# Patient Record
Sex: MALE | Race: WHITE | NOT HISPANIC OR LATINO
[De-identification: names, ages, dates, MRNs, and addresses within clinical notes are randomized per-mention and may not be internally consistent; named-entity substitution may affect disease eponyms.]

---

## 2020-09-23 ENCOUNTER — APPOINTMENT (OUTPATIENT)
Dept: HEART AND VASCULAR | Facility: CLINIC | Age: 85
End: 2020-09-23
Payer: MEDICARE

## 2020-09-23 VITALS
WEIGHT: 155 LBS | TEMPERATURE: 97.6 F | HEIGHT: 70 IN | OXYGEN SATURATION: 97 % | RESPIRATION RATE: 15 BRPM | BODY MASS INDEX: 22.19 KG/M2 | SYSTOLIC BLOOD PRESSURE: 118 MMHG | HEART RATE: 98 BPM | DIASTOLIC BLOOD PRESSURE: 72 MMHG

## 2020-09-23 DIAGNOSIS — Z82.49 FAMILY HISTORY OF ISCHEMIC HEART DISEASE AND OTHER DISEASES OF THE CIRCULATORY SYSTEM: ICD-10-CM

## 2020-09-23 DIAGNOSIS — E78.2 MIXED HYPERLIPIDEMIA: ICD-10-CM

## 2020-09-23 DIAGNOSIS — R53.83 OTHER FATIGUE: ICD-10-CM

## 2020-09-23 DIAGNOSIS — I10 ESSENTIAL (PRIMARY) HYPERTENSION: ICD-10-CM

## 2020-09-23 DIAGNOSIS — Z78.9 OTHER SPECIFIED HEALTH STATUS: ICD-10-CM

## 2020-09-23 DIAGNOSIS — Z82.3 FAMILY HISTORY OF STROKE: ICD-10-CM

## 2020-09-23 PROBLEM — Z00.00 ENCOUNTER FOR PREVENTIVE HEALTH EXAMINATION: Status: ACTIVE | Noted: 2020-09-23

## 2020-09-23 PROCEDURE — 93224 XTRNL ECG REC UP TO 48 HRS: CPT

## 2020-09-23 PROCEDURE — 99204 OFFICE O/P NEW MOD 45 MIN: CPT

## 2020-09-23 RX ORDER — AMLODIPINE BESYLATE 5 MG/1
5 TABLET ORAL
Refills: 0 | Status: ACTIVE | COMMUNITY

## 2020-09-23 RX ORDER — METOPROLOL SUCCINATE 50 MG/1
50 TABLET, EXTENDED RELEASE ORAL
Refills: 0 | Status: ACTIVE | COMMUNITY

## 2020-09-23 RX ORDER — SIMVASTATIN 20 MG/1
20 TABLET, FILM COATED ORAL
Refills: 0 | Status: ACTIVE | COMMUNITY

## 2020-09-23 NOTE — PHYSICAL EXAM
[General Appearance - Well Developed] : well developed [Normal Appearance] : normal appearance [Well Groomed] : well groomed [General Appearance - Well Nourished] : well nourished [No Deformities] : no deformities [General Appearance - In No Acute Distress] : no acute distress [Normal Conjunctiva] : the conjunctiva exhibited no abnormalities [Eyelids - No Xanthelasma] : the eyelids demonstrated no xanthelasmas [Normal Oral Mucosa] : normal oral mucosa [No Oral Pallor] : no oral pallor [No Oral Cyanosis] : no oral cyanosis [Normal Jugular Venous A Waves Present] : normal jugular venous A waves present [Normal Jugular Venous V Waves Present] : normal jugular venous V waves present [No Jugular Venous Yoder A Waves] : no jugular venous yoder A waves [Respiration, Rhythm And Depth] : normal respiratory rhythm and effort [Exaggerated Use Of Accessory Muscles For Inspiration] : no accessory muscle use [Auscultation Breath Sounds / Voice Sounds] : lungs were clear to auscultation bilaterally [Heart Rate And Rhythm] : heart rate and rhythm were normal [Heart Sounds] : normal S1 and S2 [Murmurs] : no murmurs present [Abdomen Soft] : soft [Abdomen Tenderness] : non-tender [Abdomen Mass (___ Cm)] : no abdominal mass palpated [Nail Clubbing] : no clubbing of the fingernails [Cyanosis, Localized] : no localized cyanosis [Petechial Hemorrhages (___cm)] : no petechial hemorrhages [Skin Color & Pigmentation] : normal skin color and pigmentation [] : no rash [No Venous Stasis] : no venous stasis [Skin Lesions] : no skin lesions [No Skin Ulcers] : no skin ulcer [No Xanthoma] : no  xanthoma was observed [Oriented To Time, Place, And Person] : oriented to person, place, and time [Affect] : the affect was normal [Mood] : the mood was normal [No Anxiety] : not feeling anxious

## 2020-09-23 NOTE — HISTORY OF PRESENT ILLNESS
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

## 2020-09-24 ENCOUNTER — APPOINTMENT (OUTPATIENT)
Dept: HEART AND VASCULAR | Facility: CLINIC | Age: 85
End: 2020-09-24
Payer: MEDICARE

## 2020-09-24 VITALS
OXYGEN SATURATION: 96 % | SYSTOLIC BLOOD PRESSURE: 110 MMHG | TEMPERATURE: 97.6 F | WEIGHT: 155 LBS | BODY MASS INDEX: 22.19 KG/M2 | RESPIRATION RATE: 15 BRPM | HEART RATE: 93 BPM | DIASTOLIC BLOOD PRESSURE: 66 MMHG | HEIGHT: 70 IN

## 2020-09-24 DIAGNOSIS — I49.3 VENTRICULAR PREMATURE DEPOLARIZATION: ICD-10-CM

## 2020-09-24 PROCEDURE — 99213 OFFICE O/P EST LOW 20 MIN: CPT

## 2020-09-24 PROCEDURE — 93306 TTE W/DOPPLER COMPLETE: CPT

## 2020-09-24 NOTE — DISCUSSION/SUMMARY
[FreeTextEntry1] : echo- aortic sclerosis but otherwise ok\par will review holter\par will consider increasing beta based on PVC burden\par discussed overall benign nature of pvc's in setting of normal EF and absence of angina

## 2023-04-14 ENCOUNTER — APPOINTMENT (OUTPATIENT)
Dept: OPHTHALMOLOGY | Facility: CLINIC | Age: 88
End: 2023-04-14
Payer: MEDICARE

## 2023-04-14 ENCOUNTER — NON-APPOINTMENT (OUTPATIENT)
Age: 88
End: 2023-04-14

## 2023-04-14 PROCEDURE — 92250 FUNDUS PHOTOGRAPHY W/I&R: CPT

## 2023-04-14 PROCEDURE — 92004 COMPRE OPH EXAM NEW PT 1/>: CPT

## 2023-04-14 PROCEDURE — 92136 OPHTHALMIC BIOMETRY: CPT

## 2023-04-14 PROCEDURE — 92025 CPTRIZED CORNEAL TOPOGRAPHY: CPT

## 2023-07-14 RX ORDER — ACETAMINOPHEN 500 MG
650 TABLET ORAL EVERY 6 HOURS
Refills: 0 | Status: DISCONTINUED | OUTPATIENT
Start: 2023-07-18 | End: 2023-07-18

## 2023-07-17 NOTE — ASU PATIENT PROFILE, ADULT - ABLE TO REACH PT
voicemail instructions left on phone number . Pt to be NPO for 10 hours before procedure. No lotions perfume, jewelry or makeup, no body piercing. Pt should  also make arrangements to be picked up by escort.  take picture ID and Insurance card to Facility/no

## 2023-07-17 NOTE — ASU PATIENT PROFILE, ADULT - NSICDXPASTSURGICALHX_GEN_ALL_CORE_FT
PAST SURGICAL HISTORY:  H/O inguinal hernia repair     H/O: glaucoma     History of tonsillectomy     Status post Mohs surgery

## 2023-07-17 NOTE — ASU PATIENT PROFILE, ADULT - NSICDXPASTMEDICALHX_GEN_ALL_CORE_FT
PAST MEDICAL HISTORY:  Acid reflux     BPH (benign prostatic hyperplasia)     CA skin, basal cell     High blood pressure     Ocular migraine

## 2023-07-17 NOTE — ASU PATIENT PROFILE, ADULT - FALL HARM RISK - RISK INTERVENTIONS

## 2023-07-18 ENCOUNTER — TRANSCRIPTION ENCOUNTER (OUTPATIENT)
Age: 88
End: 2023-07-18

## 2023-07-18 ENCOUNTER — APPOINTMENT (OUTPATIENT)
Dept: OPHTHALMOLOGY | Facility: AMBULATORY SURGERY CENTER | Age: 88
End: 2023-07-18

## 2023-07-18 ENCOUNTER — OUTPATIENT (OUTPATIENT)
Dept: OUTPATIENT SERVICES | Facility: HOSPITAL | Age: 88
LOS: 1 days | Discharge: HOME | End: 2023-07-18
Payer: MEDICARE

## 2023-07-18 VITALS
OXYGEN SATURATION: 97 % | SYSTOLIC BLOOD PRESSURE: 143 MMHG | RESPIRATION RATE: 16 BRPM | TEMPERATURE: 98 F | DIASTOLIC BLOOD PRESSURE: 67 MMHG | HEART RATE: 64 BPM

## 2023-07-18 VITALS
RESPIRATION RATE: 14 BRPM | SYSTOLIC BLOOD PRESSURE: 158 MMHG | WEIGHT: 138.89 LBS | OXYGEN SATURATION: 96 % | TEMPERATURE: 97 F | DIASTOLIC BLOOD PRESSURE: 75 MMHG | HEART RATE: 62 BPM | HEIGHT: 70 IN

## 2023-07-18 DIAGNOSIS — Z98.890 OTHER SPECIFIED POSTPROCEDURAL STATES: Chronic | ICD-10-CM

## 2023-07-18 DIAGNOSIS — Z90.89 ACQUIRED ABSENCE OF OTHER ORGANS: Chronic | ICD-10-CM

## 2023-07-18 DIAGNOSIS — Z86.69 PERSONAL HISTORY OF OTHER DISEASES OF THE NERVOUS SYSTEM AND SENSE ORGANS: Chronic | ICD-10-CM

## 2023-07-18 PROCEDURE — 6698F: CPT | Mod: LT

## 2023-07-18 PROCEDURE — 66982 XCAPSL CTRC RMVL CPLX WO ECP: CPT | Mod: LT

## 2023-07-18 DEVICE — LENS IOL CLAREON CCA0T0 16.5D
Type: IMPLANTABLE DEVICE | Site: LEFT | Status: NON-FUNCTIONAL
Removed: 2023-07-18

## 2023-07-18 RX ORDER — TROPICAMIDE 1 %
1 DROPS OPHTHALMIC (EYE)
Refills: 0 | Status: COMPLETED | OUTPATIENT
Start: 2023-07-18 | End: 2023-07-18

## 2023-07-18 RX ORDER — PHENYLEPHRINE HCL 2.5 %
1 DROPS OPHTHALMIC (EYE)
Refills: 0 | Status: COMPLETED | OUTPATIENT
Start: 2023-07-18 | End: 2023-07-18

## 2023-07-18 RX ORDER — OFLOXACIN 0.3 %
1 DROPS OPHTHALMIC (EYE)
Refills: 0 | Status: COMPLETED | OUTPATIENT
Start: 2023-07-18 | End: 2023-07-18

## 2023-07-18 RX ADMIN — Medication 1 DROP(S): at 10:40

## 2023-07-18 RX ADMIN — Medication 1 DROP(S): at 10:30

## 2023-07-18 RX ADMIN — Medication 1 DROP(S): at 10:35

## 2023-07-18 NOTE — ASU DISCHARGE PLAN (ADULT/PEDIATRIC) - NS MD DC FALL RISK RISK
For information on Fall & Injury Prevention, visit: [email or web address] OR  [email or web address] OR  https://www.cdc.gov/steadi/patient.html

## 2023-07-18 NOTE — PRE-ANESTHESIA EVALUATION ADULT - NSANTHOSAYNRD_GEN_A_CORE
No. NEIDA screening performed.  STOP BANG Legend: 0-2 = LOW Risk; 3-4 = INTERMEDIATE Risk; 5-8 = HIGH Risk

## 2023-07-18 NOTE — OPERATIVE REPORT - OPERATIVE RPOSRT DETAILS
PATIENT: HEATHER BLAKELY	    ACCOUNT  NUMBER:  [id number]     OPERATING SURGEON:  Dr. Guillermo Lewis	    ASSISTANT SURGEON:  [  Amanda Hall MD   ]    DATE OF SURGERY:  [  7/18/2023   ]	    ANESTHESIA:  MAC/TOPICAL	    ESTIMATED BLOOD LOSS: < 1mL	    COMPLICATIONS:  [  none   ]		    PREOPERATIVE DIAGNOSIS:  Cataract,  [   left  ] eye    POSTOPERATIVE DIAGNOSIS:  Cataract, [ left  ] eye    OPERATIVE PROCEDURE:  1. Femtosecond laser assisted laser surgery        [ left  ] eye.  2. Phacoemulsification with insertion of posterior chamber intraocular lens   [  left  ] eye    LENS IMPLANT: [ CCA0T0 +16.50   ]    PROCEDURE:	The patient was brought to the laser room where certain aspects of the procedure were performed with the femtosecond laser.     The patient was then brought into the operating room and placed supine on the operating room table. After uneventful induction of neuroleptic anesthesia, lidocaine gel was instilled into the operative eye achieving sufficient anesthesia.  The patient was then prepped and draped in the usual sterile fashion.  A wire lid speculum was then inserted into the operative eye giving a wide palpebral fissure.      	A gama knife was used to perform paracenteses through clear cornea at the 12 and 6 o’clock limbal positions.  The anterior chamber was irrigated with lidocaine 1% nonpreserved followed by epinephrine 0.1% nonpreserved.  Viscoelastic was then used to maintain the anterior chamber.  A keratome was used to create a clear corneal incision just inside the temporal limbus.  Capsulorhexis forceps were used to complete the capsulorhexis. Given poor zonular support, additional paracenteses were made and four capsular tension hooks were placed. Balanced salt solution was used to hydrodissect the nucleus.  The [employer] phacoemulsification unit was then used to completely phacoemulsify the nucleus, following which an aspirating handpiece was used to aspirate all cortical remnants from the capsular bag.  Viscoelastic was  used to reform the anterior chamber and capsular bag.      	A new foldable posterior chamber intraocular lens was brought into the surgical field.  It was folded and directly injected into the capsular bag following which it was centered and secure. The capsular tension hooks were removed. All viscoelastic was then aspirated from the anterior segment using an aspirating handpiece.  All wounds were checked for leaks and there were none.  Tobradex drops were used to irrigate the surface of the eye.  The lid speculum was removed from the eye and a shield placed over the eye.      	The patient tolerated the procedure well and went to the recovery area in good condition.

## 2023-07-19 ENCOUNTER — NON-APPOINTMENT (OUTPATIENT)
Age: 88
End: 2023-07-19

## 2023-07-19 ENCOUNTER — APPOINTMENT (OUTPATIENT)
Dept: OPHTHALMOLOGY | Facility: CLINIC | Age: 88
End: 2023-07-19
Payer: MEDICARE

## 2023-07-19 PROCEDURE — 99024 POSTOP FOLLOW-UP VISIT: CPT

## 2023-07-21 RX ORDER — ACETAMINOPHEN 500 MG
650 TABLET ORAL EVERY 6 HOURS
Refills: 0 | Status: DISCONTINUED | OUTPATIENT
Start: 2023-07-25 | End: 2023-07-25

## 2023-07-21 RX ORDER — SODIUM CHLORIDE 9 MG/ML
1000 INJECTION, SOLUTION INTRAVENOUS
Refills: 0 | Status: DISCONTINUED | OUTPATIENT
Start: 2023-07-25 | End: 2023-07-25

## 2023-07-21 NOTE — OPERATIVE REPORT - OPERATIVE RPOSRT DETAILS
PATIENT: HEATHER BLAKELY	    ACCOUNT  NUMBER:  [id number]     OPERATING SURGEON:  Dr. Guillermo Lewis	    ASSISTANT SURGEON:  [  Amanda Hall MD   ]    DATE OF SURGERY:  [  7/25/2023   ]	    ANESTHESIA:  MAC/TOPICAL	    ESTIMATED BLOOD LOSS: < 1mL	    COMPLICATIONS:  [  none   ]		    PREOPERATIVE DIAGNOSIS:  Cataract,  [   right   ] eye    POSTOPERATIVE DIAGNOSIS:  Cataract, [  right ] eye    OPERATIVE PROCEDURE:  1. Femtosecond laser assisted laser surgery        [ right  ] eye.  2. Phacoemulsification with insertion of posterior chamber intraocular lens   [  right   ] eye    LENS IMPLANT: [  CCA0T0 +16.50  ]    PROCEDURE:	The patient was brought to the laser room where certain aspects of the procedure were performed with the femtosecond laser.     The patient was then brought into the operating room and placed supine on the operating room table. After uneventful induction of neuroleptic anesthesia, lidocaine gel was instilled into the operative eye achieving sufficient anesthesia.  The patient was then prepped and draped in the usual sterile fashion.  A wire lid speculum was then inserted into the operative eye giving a wide palpebral fissure.      	A gama knife was used to perform paracenteses through clear cornea at the 12 and 6 o’clock limbal positions.  The anterior chamber was irrigated with lidocaine 1% nonpreserved followed by epinephrine 0.1% nonpreserved.  Viscoelastic was then used to maintain the anterior chamber.  A keratome was used to create a clear corneal incision just inside the temporal limbus.  Capsulorhexis forceps were used to complete the capsulorhexis. Balanced salt solution was used to hydrodissect the nucleus.  The [employer] phacoemulsification unit was then used to completely phacoemulsify the nucleus, following which an aspirating handpiece was used to aspirate all cortical remnants from the capsular bag.  Viscoelastic was  used to reform the anterior chamber and capsular bag.      	A new foldable posterior chamber intraocular lens was brought into the surgical field.  It was folded and directly injected into the capsular bag following which it was centered and secure.  All viscoelastic was then aspirated from the anterior segment using an aspirating handpiece.  All wounds were checked for leaks and there were none.  Tobradex drops were used to irrigate the surface of the eye.  The lid speculum was removed from the eye and a shield placed over the eye.      	The patient tolerated the procedure well and went to the recovery area in good condition.

## 2023-07-24 NOTE — ASU PATIENT PROFILE, ADULT - FALL HARM RISK - RISK INTERVENTIONS

## 2023-07-24 NOTE — ASU PATIENT PROFILE, ADULT - NSICDXPASTSURGICALHX_GEN_ALL_CORE_FT
PAST SURGICAL HISTORY:  H/O inguinal hernia repair     H/O: glaucoma     History of tonsillectomy     S/P cataract surgery left    Status post Mohs surgery

## 2023-07-24 NOTE — ASU PATIENT PROFILE, ADULT - NS PREOP UNDERSTANDS INFO
No solid food/dairy/candy/gum after 11:30pm tonight; water allowed before 06:30am tomorrow; Patient to come with photo ID/insurance card; dress in comfortable clothes; no jewelries/contact lens/valuables; no smoking/alcohol drinking/recreational drug use; escort to have photo ID;  parking available; address and callback number given./yes

## 2023-07-24 NOTE — OPERATIVE REPORT - OPERATIVE RPOSRT DETAILS
PATIENT: HEATHER BLAKELY	    ACCOUNT  NUMBER:  [id number]     OPERATING SURGEON:  Dr. Guillermo Lewis	    ASSISTANT SURGEON:  [  Amanda Hall MD   ]    DATE OF SURGERY:  [  7/25/2023   ]	    ANESTHESIA:  MAC/TOPICAL	    ESTIMATED BLOOD LOSS: < 1mL	    COMPLICATIONS:  [  none   ]		    PREOPERATIVE DIAGNOSIS:  Cataract,  [  right  ] eye    POSTOPERATIVE DIAGNOSIS:  Cataract, [ right  ] eye    OPERATIVE PROCEDURE:  1. Femtosecond laser assisted laser surgery        [ right ] eye.  2. Phacoemulsification with insertion of posterior chamber intraocular lens   [  right  ] eye    LENS IMPLANT: [  CCA0T0 +16.50   ]    PROCEDURE:	The patient was brought to the laser room where certain aspects of the procedure were performed with the femtosecond laser.     The patient was then brought into the operating room and placed supine on the operating room table. After uneventful induction of neuroleptic anesthesia, lidocaine gel was instilled into the operative eye achieving sufficient anesthesia.  The patient was then prepped and draped in the usual sterile fashion.  A wire lid speculum was then inserted into the operative eye giving a wide palpebral fissure.      	A gama knife was used to perform paracenteses through clear cornea at the 12 and 6 o’clock limbal positions.  The anterior chamber was irrigated with lidocaine 1% nonpreserved followed by epinephrine 0.1% nonpreserved.  Viscoelastic was then used to maintain the anterior chamber.  A keratome was used to create a clear corneal incision just inside the temporal limbus.  Capsulorhexis forceps were used to complete the capsulorhexis. Balanced salt solution was used to hydrodissect the nucleus.  The [employer] phacoemulsification unit was then used to completely phacoemulsify the nucleus, following which an aspirating handpiece was used to aspirate all cortical remnants from the capsular bag.  Viscoelastic was  used to reform the anterior chamber and capsular bag.      	A new foldable posterior chamber intraocular lens was brought into the surgical field.  It was folded and directly injected into the capsular bag following which it was centered and secure.  All viscoelastic was then aspirated from the anterior segment using an aspirating handpiece.  All wounds were checked for leaks and there were none.  Tobradex drops were used to irrigate the surface of the eye.  The lid speculum was removed from the eye and a shield placed over the eye.      	The patient tolerated the procedure well and went to the recovery area in good condition.

## 2023-07-25 ENCOUNTER — TRANSCRIPTION ENCOUNTER (OUTPATIENT)
Age: 88
End: 2023-07-25

## 2023-07-25 ENCOUNTER — OUTPATIENT (OUTPATIENT)
Dept: OUTPATIENT SERVICES | Facility: HOSPITAL | Age: 88
LOS: 1 days | Discharge: HOME | End: 2023-07-25

## 2023-07-25 ENCOUNTER — APPOINTMENT (OUTPATIENT)
Dept: OPHTHALMOLOGY | Facility: AMBULATORY SURGERY CENTER | Age: 88
End: 2023-07-25
Payer: MEDICARE

## 2023-07-25 VITALS
TEMPERATURE: 98 F | OXYGEN SATURATION: 96 % | DIASTOLIC BLOOD PRESSURE: 59 MMHG | SYSTOLIC BLOOD PRESSURE: 132 MMHG | RESPIRATION RATE: 16 BRPM | HEART RATE: 53 BPM

## 2023-07-25 VITALS
DIASTOLIC BLOOD PRESSURE: 57 MMHG | RESPIRATION RATE: 16 BRPM | HEART RATE: 66 BPM | TEMPERATURE: 100 F | OXYGEN SATURATION: 98 % | HEIGHT: 71 IN | WEIGHT: 141.1 LBS | SYSTOLIC BLOOD PRESSURE: 148 MMHG

## 2023-07-25 DIAGNOSIS — Z98.890 OTHER SPECIFIED POSTPROCEDURAL STATES: Chronic | ICD-10-CM

## 2023-07-25 DIAGNOSIS — Z98.49 CATARACT EXTRACTION STATUS, UNSPECIFIED EYE: Chronic | ICD-10-CM

## 2023-07-25 DIAGNOSIS — Z86.69 PERSONAL HISTORY OF OTHER DISEASES OF THE NERVOUS SYSTEM AND SENSE ORGANS: Chronic | ICD-10-CM

## 2023-07-25 DIAGNOSIS — Z90.89 ACQUIRED ABSENCE OF OTHER ORGANS: Chronic | ICD-10-CM

## 2023-07-25 PROCEDURE — 6698F: CPT | Mod: RT

## 2023-07-25 PROCEDURE — 66984 XCAPSL CTRC RMVL W/O ECP: CPT | Mod: 79,RT

## 2023-07-25 DEVICE — LENS IOL CLAREON CCA0T0 16.5D
Type: IMPLANTABLE DEVICE | Site: RIGHT | Status: NON-FUNCTIONAL
Removed: 2023-07-25

## 2023-07-25 RX ORDER — SIMVASTATIN 20 MG/1
1 TABLET, FILM COATED ORAL
Refills: 0 | DISCHARGE

## 2023-07-25 RX ORDER — PHENYLEPHRINE HCL 2.5 %
1 DROPS OPHTHALMIC (EYE)
Refills: 0 | Status: COMPLETED | OUTPATIENT
Start: 2023-07-25 | End: 2023-07-25

## 2023-07-25 RX ORDER — TROPICAMIDE 1 %
1 DROPS OPHTHALMIC (EYE)
Refills: 0 | Status: COMPLETED | OUTPATIENT
Start: 2023-07-25 | End: 2023-07-25

## 2023-07-25 RX ORDER — OFLOXACIN 0.3 %
1 DROPS OPHTHALMIC (EYE)
Refills: 0 | Status: COMPLETED | OUTPATIENT
Start: 2023-07-25 | End: 2023-07-25

## 2023-07-25 RX ORDER — SODIUM CHLORIDE 9 MG/ML
500 INJECTION, SOLUTION INTRAVENOUS
Refills: 0 | Status: DISCONTINUED | OUTPATIENT
Start: 2023-07-25 | End: 2023-07-25

## 2023-07-25 RX ORDER — ACETAMINOPHEN 500 MG
650 TABLET ORAL ONCE
Refills: 0 | Status: DISCONTINUED | OUTPATIENT
Start: 2023-07-25 | End: 2023-07-25

## 2023-07-25 RX ORDER — LATANOPROST 0.05 MG/ML
1 SOLUTION/ DROPS OPHTHALMIC; TOPICAL
Refills: 0 | DISCHARGE

## 2023-07-25 RX ORDER — METOPROLOL TARTRATE 50 MG
1 TABLET ORAL
Refills: 0 | DISCHARGE

## 2023-07-25 RX ORDER — FINASTERIDE 5 MG/1
1 TABLET, FILM COATED ORAL
Refills: 0 | DISCHARGE

## 2023-07-25 RX ORDER — AMLODIPINE BESYLATE 2.5 MG/1
1 TABLET ORAL
Refills: 0 | DISCHARGE

## 2023-07-25 RX ORDER — ONDANSETRON 8 MG/1
4 TABLET, FILM COATED ORAL ONCE
Refills: 0 | Status: DISCONTINUED | OUTPATIENT
Start: 2023-07-25 | End: 2023-07-25

## 2023-07-25 RX ADMIN — Medication 1 DROP(S): at 08:30

## 2023-07-25 RX ADMIN — Medication 1 DROP(S): at 08:35

## 2023-07-25 RX ADMIN — Medication 1 DROP(S): at 08:25

## 2023-07-25 NOTE — ASU DISCHARGE PLAN (ADULT/PEDIATRIC) - NS MD DC FALL RISK RISK
For information on Fall & Injury Prevention, visit: [email or web address] OR  [email or web address] OR  https://www.cdc.gov/steadi/patient.html

## 2023-07-26 ENCOUNTER — NON-APPOINTMENT (OUTPATIENT)
Age: 88
End: 2023-07-26

## 2023-07-26 ENCOUNTER — APPOINTMENT (OUTPATIENT)
Dept: OPHTHALMOLOGY | Facility: CLINIC | Age: 88
End: 2023-07-26
Payer: MEDICARE

## 2023-07-26 PROCEDURE — 99024 POSTOP FOLLOW-UP VISIT: CPT

## 2023-08-02 ENCOUNTER — APPOINTMENT (OUTPATIENT)
Dept: OPHTHALMOLOGY | Facility: CLINIC | Age: 88
End: 2023-08-02
Payer: MEDICARE

## 2023-08-02 ENCOUNTER — NON-APPOINTMENT (OUTPATIENT)
Age: 88
End: 2023-08-02

## 2023-08-02 PROBLEM — C44.91 BASAL CELL CARCINOMA OF SKIN, UNSPECIFIED: Chronic | Status: ACTIVE | Noted: 2023-07-18

## 2023-08-02 PROBLEM — I10 ESSENTIAL (PRIMARY) HYPERTENSION: Chronic | Status: ACTIVE | Noted: 2023-07-18

## 2023-08-02 PROCEDURE — 99024 POSTOP FOLLOW-UP VISIT: CPT

## 2023-08-24 ENCOUNTER — NON-APPOINTMENT (OUTPATIENT)
Age: 88
End: 2023-08-24

## 2023-08-24 ENCOUNTER — APPOINTMENT (OUTPATIENT)
Dept: OPHTHALMOLOGY | Facility: CLINIC | Age: 88
End: 2023-08-24
Payer: MEDICARE

## 2023-08-24 PROCEDURE — 99024 POSTOP FOLLOW-UP VISIT: CPT

## 2023-09-07 PROBLEM — G43.109 MIGRAINE WITH AURA, NOT INTRACTABLE, WITHOUT STATUS MIGRAINOSUS: Chronic | Status: ACTIVE | Noted: 2023-07-18

## 2023-09-07 PROBLEM — N40.0 BENIGN PROSTATIC HYPERPLASIA WITHOUT LOWER URINARY TRACT SYMPTOMS: Chronic | Status: ACTIVE | Noted: 2023-07-18

## 2023-09-07 PROBLEM — K21.9 GASTRO-ESOPHAGEAL REFLUX DISEASE WITHOUT ESOPHAGITIS: Chronic | Status: ACTIVE | Noted: 2023-07-18

## 2023-10-20 NOTE — ASU PREOP CHECKLIST - WARM FLUIDS/WARM BLANKETS
Group Topic: BH TALI Education    Date: 10/20/2023  Start Time: 1400  End Time: 1500  Facilitators: Nicholas Hong LCSW    Focus: Recovery plan development and evaluation  Number in attendance: 6      Goals: Recovery plan development and evaluation: To assist client in developing their own personalized recovery plan by reviewing what research shows about what people successful in recovery actually do to create change  Handouts: Recovery Planning Worksheet  Method: Group  Attendance: Present  Mood/Affect: Appropriate  Behavior/Socialization: Appropriate to group and Provided feedback to peer  Participation: Active  Overall Patient Response to Group: Appropriate to topic and Asked questions  Individual Response to Group: Pt was active in discussion on how to create/review recovery plan as part of stabilizing from relapse  Ability to Apply Content to Group: 4           no

## (undated) DEVICE — SUT NYLON 10-0 12" CU-5

## (undated) DEVICE — SOL IRR BAL SALT 500ML

## (undated) DEVICE — Device

## (undated) DEVICE — DRSG CURITY GAUZE SPONGE 4 X 4" 12-PLY

## (undated) DEVICE — CANNULA ANT CHMBR 27GX22MM

## (undated) DEVICE — PACK CENTURION 2.4MM

## (undated) DEVICE — CATARACT KIT

## (undated) DEVICE — DRAPE MICROSCOPE KNOB COVER SMALL (2 PCS)

## (undated) DEVICE — SPECIMEN CONTAINER 100ML

## (undated) DEVICE — GLV 8 PROTEXIS (WHITE)

## (undated) DEVICE — PACK ANTERIOR SEGMENT

## (undated) DEVICE — WARMING BLANKET LOWER ADULT

## (undated) DEVICE — CANNULA IRR ANT CHAMBER 30G

## (undated) DEVICE — NDL RETROBULBAR VISITEC 25X1.5

## (undated) DEVICE — KIT CENTURION ANTERIOR

## (undated) DEVICE — SPECIMEN CONTAINER 4OZ

## (undated) DEVICE — VENODYNE/SCD SLEEVE CALF MEDIUM